# Patient Record
Sex: FEMALE | Race: WHITE | HISPANIC OR LATINO | URBAN - METROPOLITAN AREA
[De-identification: names, ages, dates, MRNs, and addresses within clinical notes are randomized per-mention and may not be internally consistent; named-entity substitution may affect disease eponyms.]

---

## 2019-07-03 PROBLEM — Z00.00 ENCOUNTER FOR PREVENTIVE HEALTH EXAMINATION: Status: ACTIVE | Noted: 2019-07-03

## 2019-07-20 ENCOUNTER — EMERGENCY (EMERGENCY)
Facility: HOSPITAL | Age: 29
LOS: 1 days | Discharge: HOME | End: 2019-07-20
Attending: STUDENT IN AN ORGANIZED HEALTH CARE EDUCATION/TRAINING PROGRAM
Payer: COMMERCIAL

## 2019-07-20 VITALS
WEIGHT: 169.98 LBS | HEIGHT: 62 IN | SYSTOLIC BLOOD PRESSURE: 136 MMHG | TEMPERATURE: 98 F | HEART RATE: 91 BPM | DIASTOLIC BLOOD PRESSURE: 86 MMHG | RESPIRATION RATE: 18 BRPM | OXYGEN SATURATION: 96 %

## 2019-07-20 VITALS
SYSTOLIC BLOOD PRESSURE: 125 MMHG | RESPIRATION RATE: 20 BRPM | HEART RATE: 85 BPM | OXYGEN SATURATION: 97 % | DIASTOLIC BLOOD PRESSURE: 71 MMHG

## 2019-07-20 LAB
ALBUMIN SERPL ELPH-MCNC: 3 G/DL — LOW (ref 3.5–5)
ALP SERPL-CCNC: 64 U/L — SIGNIFICANT CHANGE UP (ref 40–120)
ALT FLD-CCNC: 51 U/L DA — SIGNIFICANT CHANGE UP (ref 10–60)
ANION GAP SERPL CALC-SCNC: 5 MMOL/L — SIGNIFICANT CHANGE UP (ref 5–17)
APPEARANCE UR: CLEAR — SIGNIFICANT CHANGE UP
AST SERPL-CCNC: 26 U/L — SIGNIFICANT CHANGE UP (ref 10–40)
BACTERIA # UR AUTO: ABNORMAL /HPF
BILIRUB SERPL-MCNC: 0.6 MG/DL — SIGNIFICANT CHANGE UP (ref 0.2–1.2)
BILIRUB UR-MCNC: NEGATIVE — SIGNIFICANT CHANGE UP
BUN SERPL-MCNC: 11 MG/DL — SIGNIFICANT CHANGE UP (ref 7–18)
CALCIUM SERPL-MCNC: 8.7 MG/DL — SIGNIFICANT CHANGE UP (ref 8.4–10.5)
CHLORIDE SERPL-SCNC: 105 MMOL/L — SIGNIFICANT CHANGE UP (ref 96–108)
CO2 SERPL-SCNC: 26 MMOL/L — SIGNIFICANT CHANGE UP (ref 22–31)
COLOR SPEC: YELLOW — SIGNIFICANT CHANGE UP
CREAT SERPL-MCNC: 0.51 MG/DL — SIGNIFICANT CHANGE UP (ref 0.5–1.3)
DIFF PNL FLD: NEGATIVE — SIGNIFICANT CHANGE UP
EPI CELLS # UR: ABNORMAL /HPF
GLUCOSE SERPL-MCNC: 85 MG/DL — SIGNIFICANT CHANGE UP (ref 70–99)
GLUCOSE UR QL: NEGATIVE — SIGNIFICANT CHANGE UP
HCG UR QL: POSITIVE
HCT VFR BLD CALC: 37.8 % — SIGNIFICANT CHANGE UP (ref 34.5–45)
HGB BLD-MCNC: 12.8 G/DL — SIGNIFICANT CHANGE UP (ref 11.5–15.5)
KETONES UR-MCNC: ABNORMAL
LEUKOCYTE ESTERASE UR-ACNC: ABNORMAL
MCHC RBC-ENTMCNC: 29.6 PG — SIGNIFICANT CHANGE UP (ref 27–34)
MCHC RBC-ENTMCNC: 33.9 GM/DL — SIGNIFICANT CHANGE UP (ref 32–36)
MCV RBC AUTO: 87.5 FL — SIGNIFICANT CHANGE UP (ref 80–100)
NITRITE UR-MCNC: NEGATIVE — SIGNIFICANT CHANGE UP
NRBC # BLD: 0 /100 WBCS — SIGNIFICANT CHANGE UP (ref 0–0)
PH UR: 6 — SIGNIFICANT CHANGE UP (ref 5–8)
PLATELET # BLD AUTO: 214 K/UL — SIGNIFICANT CHANGE UP (ref 150–400)
POTASSIUM SERPL-MCNC: 3.9 MMOL/L — SIGNIFICANT CHANGE UP (ref 3.5–5.3)
POTASSIUM SERPL-SCNC: 3.9 MMOL/L — SIGNIFICANT CHANGE UP (ref 3.5–5.3)
PROT SERPL-MCNC: 7.3 G/DL — SIGNIFICANT CHANGE UP (ref 6–8.3)
PROT UR-MCNC: NEGATIVE — SIGNIFICANT CHANGE UP
RBC # BLD: 4.32 M/UL — SIGNIFICANT CHANGE UP (ref 3.8–5.2)
RBC # FLD: 12 % — SIGNIFICANT CHANGE UP (ref 10.3–14.5)
RBC CASTS # UR COMP ASSIST: SIGNIFICANT CHANGE UP /HPF (ref 0–2)
SODIUM SERPL-SCNC: 136 MMOL/L — SIGNIFICANT CHANGE UP (ref 135–145)
SP GR SPEC: 1.02 — SIGNIFICANT CHANGE UP (ref 1.01–1.02)
UROBILINOGEN FLD QL: NEGATIVE — SIGNIFICANT CHANGE UP
WBC # BLD: 6.7 K/UL — SIGNIFICANT CHANGE UP (ref 3.8–10.5)
WBC # FLD AUTO: 6.7 K/UL — SIGNIFICANT CHANGE UP (ref 3.8–10.5)
WBC UR QL: ABNORMAL /HPF (ref 0–5)

## 2019-07-20 PROCEDURE — 80053 COMPREHEN METABOLIC PANEL: CPT

## 2019-07-20 PROCEDURE — 85027 COMPLETE CBC AUTOMATED: CPT

## 2019-07-20 PROCEDURE — 81001 URINALYSIS AUTO W/SCOPE: CPT

## 2019-07-20 PROCEDURE — 36415 COLL VENOUS BLD VENIPUNCTURE: CPT

## 2019-07-20 PROCEDURE — 87086 URINE CULTURE/COLONY COUNT: CPT

## 2019-07-20 PROCEDURE — 99284 EMERGENCY DEPT VISIT MOD MDM: CPT

## 2019-07-20 PROCEDURE — 99283 EMERGENCY DEPT VISIT LOW MDM: CPT

## 2019-07-20 PROCEDURE — 81025 URINE PREGNANCY TEST: CPT

## 2019-07-20 RX ORDER — NITROFURANTOIN MACROCRYSTAL 50 MG
1 CAPSULE ORAL
Qty: 10 | Refills: 0
Start: 2019-07-20 | End: 2019-07-24

## 2019-07-20 RX ORDER — NITROFURANTOIN MACROCRYSTAL 50 MG
100 CAPSULE ORAL ONCE
Refills: 0 | Status: COMPLETED | OUTPATIENT
Start: 2019-07-20 | End: 2019-07-20

## 2019-07-20 RX ORDER — SODIUM CHLORIDE 9 MG/ML
1000 INJECTION, SOLUTION INTRAVENOUS ONCE
Refills: 0 | Status: COMPLETED | OUTPATIENT
Start: 2019-07-20 | End: 2019-07-20

## 2019-07-20 RX ADMIN — SODIUM CHLORIDE 1000 MILLILITER(S): 9 INJECTION, SOLUTION INTRAVENOUS at 21:10

## 2019-07-20 RX ADMIN — SODIUM CHLORIDE 1000 MILLILITER(S): 9 INJECTION, SOLUTION INTRAVENOUS at 21:11

## 2019-07-20 RX ADMIN — Medication 100 MILLIGRAM(S): at 21:10

## 2019-07-20 NOTE — ED PROVIDER NOTE - NSFOLLOWUPINSTRUCTIONS_ED_ALL_ED_FT
Extensive conversation regarding return precaution. Your evaluation today does not require any further acute medical interventions at this time. Please follow-up with your Primary Care doctor in 2 (two) days for continuity of care. Continue all home medications as prescribed by your doctor(s). Take all medications as instructed. Do not operate any heavy machinery when taking any pain relieving medications as it may cause drowsiness and impair cognitive functioning. If new symptoms arise or current symptoms worsen, return to the nearest Emergency Department immediately.    You may take Tylenol, 650mg by mouth every 4-6 as needed for pain or fever. Do not combine with any other acetaminophen medications. Do not take more than 4,000mg in a 24hour period unless you have liver or kidney diseases then the suggested maximum dose is 3,000mg in a 24hour period.

## 2019-07-20 NOTE — ED PROVIDER NOTE - NEUROLOGICAL, MLM
Alert and oriented, no focal deficits, no motor or sensory deficits. Alert and oriented, no focal deficits, no motor or sensory deficits, moving all extremities

## 2019-07-20 NOTE — ED PROVIDER NOTE - CONSTITUTIONAL, MLM
normal... Well appearing, well nourished, awake, alert, oriented to person, place, time/situation and in no apparent distress. Well appearing, well nourished, awake, alert, oriented to person, place, time/situation and in no apparent distress. speaking in full sentences.

## 2019-07-20 NOTE — ED ADULT NURSE NOTE - NSIMPLEMENTINTERV_GEN_ALL_ED
Implemented All Universal Safety Interventions:  [place] to call system. Call bell, personal items and telephone within reach. Instruct patient to call for assistance. Room bathroom lighting operational. Non-slip footwear when patient is off stretcher. Physically safe environment: no spills, clutter or unnecessary equipment. Stretcher in lowest position, wheels locked, appropriate side rails in place.

## 2019-07-20 NOTE — ED PROVIDER NOTE - PROGRESS NOTE DETAILS
Pt eating at bedside. No new complaints. Pending UA and UCx to be sent. Pt seen and re-evaluated at bedside.  Diagnostic tests reviewed and results discussed minus UA. In v/o improved symptoms will d/c home for OP f/u. Teach back successful, understands return precautions.

## 2019-07-20 NOTE — ED PROVIDER NOTE - CARDIAC, MLM
Normal rate, regular rhythm.  Heart sounds S1, S2.  No murmurs, rubs or gallops. Heart sounds S1, S2.  No murmurs, rubs or gallops.

## 2019-07-20 NOTE — ED PROVIDER NOTE - OBJECTIVE STATEMENT
28 y/o F patient  at 12 weeks gestation w/ PMHx of a recent UTI approximately x2 weeks ago presents to the ED c/o non-bloody diarrhea and pain with urination for the past few days. Patient reports she was eating cherry fruits at work when symptoms began. Patient states she has a UTI approximately x2 weeks ago and was provided Keflex which she completed. S/p completion of Keflex patient states she developed a yeast infection and was given suppositories which she states she did not complete. Patient notes she had a US at 9 weeks confirming she had a ITP. Patient states she has a follow-up appointment but does not recall the date.    Tolerating PO solids and liquids.  Patient denies vaginal bleeding, vaginal discharge, abd cramping, fever, or any other complaints. 30 y/o F patient , reports 12 weeks gestation, sent by private OB for c/o nonbloody diarrhea a/w pain with urination since yesterday. Reports recently dx UTI, completed Keflex 7 days approx 2 wks ago, followed by yeast infection, completed 4 days of suppositories.     Admits to eating cherry fruits at work followed by diarrhea. States private OB is aware, prescribing meds.  Reports had US at 9 weeks confirmed IUP.     Tolerating PO solids and liquids.  Patient denies vaginal bleeding/discharge, abd cramping, fever.    Pt denies falling, trauma, H/A, dizziness, LOC, changes in vision, CP, palpitations, SOB, cough, current abd pain, N/V, hematuria, incontinence, changes in bowel habit, weakness or numbness in the extremities, recent travel and known sick contacts.

## 2019-07-20 NOTE — ED ADULT TRIAGE NOTE - CHIEF COMPLAINT QUOTE
Mid upper abd pain, diarrhea, nausea x yesterday. Pt is 12 weeks pregnant. P/s eight episode of diarrhea today.

## 2019-07-22 ENCOUNTER — ASOB RESULT (OUTPATIENT)
Age: 29
End: 2019-07-22

## 2019-07-22 ENCOUNTER — APPOINTMENT (OUTPATIENT)
Dept: ANTEPARTUM | Facility: CLINIC | Age: 29
End: 2019-07-22
Payer: COMMERCIAL

## 2019-07-22 LAB
CULTURE RESULTS: SIGNIFICANT CHANGE UP
SPECIMEN SOURCE: SIGNIFICANT CHANGE UP

## 2019-07-22 PROCEDURE — 76813 OB US NUCHAL MEAS 1 GEST: CPT

## 2019-07-22 PROCEDURE — 76801 OB US < 14 WKS SINGLE FETUS: CPT

## 2019-07-22 PROCEDURE — 36416 COLLJ CAPILLARY BLOOD SPEC: CPT

## 2019-07-23 PROBLEM — N39.0 URINARY TRACT INFECTION, SITE NOT SPECIFIED: Chronic | Status: ACTIVE | Noted: 2019-07-20

## 2019-07-24 ENCOUNTER — APPOINTMENT (OUTPATIENT)
Dept: ANTEPARTUM | Facility: CLINIC | Age: 29
End: 2019-07-24
Payer: COMMERCIAL

## 2019-07-24 ENCOUNTER — ASOB RESULT (OUTPATIENT)
Age: 29
End: 2019-07-24

## 2019-07-24 PROCEDURE — 36415 COLL VENOUS BLD VENIPUNCTURE: CPT

## 2019-07-24 PROCEDURE — 99241 OFFICE CONSULTATION NEW/ESTAB PATIENT 15 MIN: CPT

## 2019-08-23 ENCOUNTER — ASOB RESULT (OUTPATIENT)
Age: 29
End: 2019-08-23

## 2019-08-23 ENCOUNTER — APPOINTMENT (OUTPATIENT)
Dept: ANTEPARTUM | Facility: CLINIC | Age: 29
End: 2019-08-23
Payer: COMMERCIAL

## 2019-08-23 PROCEDURE — 76805 OB US >/= 14 WKS SNGL FETUS: CPT

## 2019-09-18 ENCOUNTER — ASOB RESULT (OUTPATIENT)
Age: 29
End: 2019-09-18

## 2019-09-18 ENCOUNTER — APPOINTMENT (OUTPATIENT)
Dept: ANTEPARTUM | Facility: CLINIC | Age: 29
End: 2019-09-18
Payer: COMMERCIAL

## 2019-09-18 PROCEDURE — 76811 OB US DETAILED SNGL FETUS: CPT

## 2019-09-18 PROCEDURE — 99241 OFFICE CONSULTATION NEW/ESTAB PATIENT 15 MIN: CPT | Mod: 25

## 2019-10-02 ENCOUNTER — APPOINTMENT (OUTPATIENT)
Dept: ANTEPARTUM | Facility: CLINIC | Age: 29
End: 2019-10-02
Payer: COMMERCIAL

## 2019-10-02 ENCOUNTER — ASOB RESULT (OUTPATIENT)
Age: 29
End: 2019-10-02

## 2019-10-02 PROCEDURE — 76825 ECHO EXAM OF FETAL HEART: CPT

## 2019-10-02 PROCEDURE — 76827 ECHO EXAM OF FETAL HEART: CPT

## 2019-10-02 PROCEDURE — 93325 DOPPLER ECHO COLOR FLOW MAPG: CPT

## 2019-12-04 ENCOUNTER — ASOB RESULT (OUTPATIENT)
Age: 29
End: 2019-12-04

## 2019-12-04 ENCOUNTER — APPOINTMENT (OUTPATIENT)
Dept: ANTEPARTUM | Facility: CLINIC | Age: 29
End: 2019-12-04
Payer: COMMERCIAL

## 2019-12-04 PROCEDURE — 76816 OB US FOLLOW-UP PER FETUS: CPT

## 2019-12-04 PROCEDURE — 76819 FETAL BIOPHYS PROFIL W/O NST: CPT

## 2020-01-21 ENCOUNTER — OUTPATIENT (OUTPATIENT)
Dept: INPATIENT UNIT | Facility: HOSPITAL | Age: 30
LOS: 1 days | Discharge: HOME | End: 2020-01-21

## 2020-01-21 ENCOUNTER — TRANSCRIPTION ENCOUNTER (OUTPATIENT)
Age: 30
End: 2020-01-21

## 2020-01-21 ENCOUNTER — OUTPATIENT (OUTPATIENT)
Dept: INPATIENT UNIT | Facility: HOSPITAL | Age: 30
LOS: 1 days | Discharge: HOME | End: 2020-01-21
Payer: COMMERCIAL

## 2020-01-21 VITALS — SYSTOLIC BLOOD PRESSURE: 128 MMHG | HEART RATE: 71 BPM | DIASTOLIC BLOOD PRESSURE: 74 MMHG

## 2020-01-21 VITALS
SYSTOLIC BLOOD PRESSURE: 130 MMHG | HEART RATE: 75 BPM | TEMPERATURE: 99 F | DIASTOLIC BLOOD PRESSURE: 73 MMHG | RESPIRATION RATE: 16 BRPM

## 2020-01-21 VITALS
RESPIRATION RATE: 16 BRPM | SYSTOLIC BLOOD PRESSURE: 117 MMHG | TEMPERATURE: 98 F | HEART RATE: 83 BPM | DIASTOLIC BLOOD PRESSURE: 58 MMHG

## 2020-01-21 VITALS — TEMPERATURE: 99 F

## 2020-01-21 DIAGNOSIS — Z3A.00 WEEKS OF GESTATION OF PREGNANCY NOT SPECIFIED: ICD-10-CM

## 2020-01-21 DIAGNOSIS — Z98.890 OTHER SPECIFIED POSTPROCEDURAL STATES: Chronic | ICD-10-CM

## 2020-01-21 DIAGNOSIS — O26.899 OTHER SPECIFIED PREGNANCY RELATED CONDITIONS, UNSPECIFIED TRIMESTER: ICD-10-CM

## 2020-01-21 PROCEDURE — 99213 OFFICE O/P EST LOW 20 MIN: CPT | Mod: 25

## 2020-01-21 PROCEDURE — 76818 FETAL BIOPHYS PROFILE W/NST: CPT | Mod: 26

## 2020-01-21 PROCEDURE — 59025 FETAL NON-STRESS TEST: CPT | Mod: 26

## 2020-01-21 NOTE — OB PROVIDER TRIAGE NOTE - NSOBPROVIDERNOTE_OBGYN_ALL_OB_FT
Dr Sommer notified of above findings  Patient cleared to be discharge home on strict labor precautions as per Dr Sommer.

## 2020-01-21 NOTE — OB PROVIDER TRIAGE NOTE - NSOBPROVIDERNOTE_OBGYN_ALL_OB_FT
[facility] Provider - Dr Woodall    Patient is a   30y/o G  3 P 1011   @  38 4/7wks gestation who reports to triage with c/o  contractions q 10 to 15min and passing blood tinged mucus since 400.  Reports decreased fetal movements since last night.  Last good movement was felt     AP Course:  NT 95% - 3.5mm, increased risk for down syndrome                     -thickened nuchal fold on anatomy scan - fetal echo wnl  Meds -pnv  NKDA    PMH/PSH/GYN/SH - denies  OB  - - 2008 - 6lbs 9.5oz  -ETOP - 3yrs ago  GYN - HPV pap in this pregnancy - f/u post partum    Vital Signs Last 24 Hrs  T(C): 37 (2020 14:04), Max: 37 (2020 14:04)  T(F): 98.6 (2020 14:04), Max: 98.6 (2020 14:04)  HR: 75 (2020 14:56) (75 - 75)  BP: 119/77 (2020 14:56) (119/77 - 130/73)  RR: 16 (2020 14:04) (16 - 16)    Abdomen gravid, soft and nontender  Continue EFM    SSE - neg pooling/neg nitrazine  SVE - 3/60/-3  TAS - vtx/post plac/aafv  BPP -  [facility] Provider - Dr Woodall    Patient is a   30y/o G  3 P 1011   @  38 4/7wks gestation who reports to triage with c/o  contractions q 10 to 15min and passing blood tinged mucus since 400.  Reports decreased fetal movements since last night.  Last good movement was felt     AP Course:  NT 95% - 3.5mm, increased risk for down syndrome                     -thickened nuchal fold on anatomy scan - fetal echo wnl  Meds -pnv  NKDA    PMH/PSH/GYN/SH - denies  OB  - - 2008 - 6lbs 9.5oz  -ETOP - 3yrs ago  GYN - HPV pap in this pregnancy - f/u post partum    Vital Signs Last 24 Hrs  T(C): 37 (2020 14:04), Max: 37 (2020 14:04)  T(F): 98.6 (2020 14:04), Max: 98.6 (2020 14:04)  HR: 75 (2020 14:56) (75 - 75)  BP: 119/77 (2020 14:56) (119/77 - 130/73)  RR: 16 (2020 14:04) (16 - 16)    Abdomen gravid, soft and nontender  Continue EFM    SSE - neg pooling/neg nitrazine  SVE - 3/60/-3.  Patient was 2cm 3days ago  TAS - vtx/post plac/aafv  BPP -     Discussed patient with Dr. Sommer.  Plan:  patient is cleared for discharge.  Instructed to ambulate; return to triage if ROM or contractions become stronger and or more frequent.

## 2020-01-21 NOTE — OB RN TRIAGE NOTE - PMH
Abnormal cervical Papanicolaou smear, unspecified abnormal pap finding  right before preg.  Termination of pregnancy (fetus)    UTI (urinary tract infection)    Vaginal delivery   2008 6#9

## 2020-01-21 NOTE — OB PROVIDER TRIAGE NOTE - NSHPLABSRESULTS_GEN_ALL_CORE
Vital Signs Last 24 Hrs  T(C): 37.3 (21 Jan 2020 20:28), Max: 37.3 (21 Jan 2020 20:28)  T(F): 99.14 (21 Jan 2020 20:28), Max: 99.14 (21 Jan 2020 20:28)  HR: 82 (21 Jan 2020 20:23) (71 - 83)  BP: 122/73 (21 Jan 2020 20:23) (117/58 - 130/73)  BP(mean): --  RR: 16 (21 Jan 2020 19:54) (16 - 16)  SpO2: --

## 2020-01-21 NOTE — OB PROVIDER TRIAGE NOTE - ADDITIONAL INSTRUCTIONS
Follow-up in the office for routine OB care if undelivered  Continue fetal kick counts  Return to triage for: decreased fetal movement, leakage of fluid, vaginal bleeding, increase in contraction frequency and intensity, or for any other concerns.

## 2020-01-21 NOTE — OB PROVIDER TRIAGE NOTE - NS_OBGYNHISTORY_OBGYN_ALL_OB_FT
2008-Uncomplicated  @ 38+ wks. Female, 6.9#  2014-Medically induced ETOP  2019-Abn. PAP/LSIL, s/p colposcopy, will f/u PP.  Pt denies any h/o: ovarian cysts, fibroids, breast problems, STDs/STIs

## 2020-01-21 NOTE — OB PROVIDER TRIAGE NOTE - NSHPPHYSICALEXAM_GEN_ALL_CORE
Vital Signs Last 24 Hrs  T(C): 37 (21 Jan 2020 14:04), Max: 37 (21 Jan 2020 14:04)  HR: 71 (21 Jan 2020 15:19) (71 - 75)  BP: 128/74 (21 Jan 2020 15:19) (119/77 - 130/73)  RR: 16 (21 Jan 2020 14:04) (16 - 16)    Abdomen gravid, soft and nontender  Continue EFM    SSE - neg pooling/neg nitrazine  SVE - 3/60/-3  TAS - vtx/post plac/aafv  BPP - 8/8

## 2020-01-21 NOTE — OB PROVIDER TRIAGE NOTE - NSHPPHYSICALEXAM_GEN_ALL_CORE
Patient is a  28y/o  female, para 1011  @  38 4/7wks gestation, SIUP.   Early Labor  Gen: NAD, A+O x3  Cardiac: R/R/R  Pulm: CTAB  Abd: gravid, soft, non-tender  VS--BP: 122/73, P 82, RR 18, T 37.3, Pain 8/10-Pt declined any pain meds at this time.   Cat 1 tracin bpm, moderate variability, +accels, no decels  [place]: Irregular, q 7 min.  SVE - 3.5/70/-3  GBS Negative  Clinical EFW 3400g

## 2020-01-21 NOTE — OB PROVIDER TRIAGE NOTE - HISTORY OF PRESENT ILLNESS
[facility] Provider - Dr Woodall    Patient is a   30y/o G  3 P 1011   @  38 4/7wks gestation who reports to triage with c/o  contractions q 10 to 15min and passing blood tinged mucus since 0400.  Reports decreased fetal movements since last night.  Last good movement was felt     AP Course:  NT 95% - 3.5mm, increased risk for down syndrome                     -thickened nuchal fold on anatomy scan - fetal echo wnl  Meds -pnv  NKDA

## 2020-01-21 NOTE — OB PROVIDER TRIAGE NOTE - PLAN OF CARE
Patient cleared to be discharge home as per Dr Sommer  Continue fetal kick counts  Return to triage for: decreased fetal movement, leakage of fluid, vaginal bleeding, increase in contraction frequency and intensity, or for any other concerns.  Strict labor precautions reviewed with patient. Patient verbalized understanding

## 2020-01-21 NOTE — OB PROVIDER TRIAGE NOTE - PMH
Abnormal cervical Papanicolaou smear, unspecified abnormal pap finding  right before preg.  UTI (urinary tract infection)

## 2020-01-21 NOTE — OB PROVIDER TRIAGE NOTE - ADDITIONAL INSTRUCTIONS
30-Jun-2018
Discussed patient with Dr. Sommer.  Plan:  patient is cleared for discharge.  Instructed to ambulate; return to triage if ROM or contractions become stronger and or more frequent.

## 2020-01-21 NOTE — OB PROVIDER TRIAGE NOTE - HISTORY OF PRESENT ILLNESS
[facility] Provider - Dr Woodall  Patient is a  30y/o  female, para 1011  @  38 4/7wks gestation, SIUP.   Returns to triage post early labor evaluation 5 hrs ago with VE: 3/60/-3, with c/o irregular ctx q 7min.  Pt endorses +FM, denies any leakage of fluid, vaginal bleeding. GBS negative.     AP Course:  NT 95% - 3.5mm, increased risk for down syndrome                     -thickened nuchal fold on anatomy scan - fetal echo wnl  Allergies: NKDA  Meds -PNV  PMH: Denies  PSH: Colposcopy with cervical Bx (')  OBgynHx    2008-Uncomplicated  @ 38+ wks. Female, 6.9#  2014-Medically induced ETOP  2019-Abn. PAP/LSIL, s/p colposcopy, will f/u PP.  Pt denies any h/o: ovarian cysts, fibroids, breast problems, STDs/STIs  PSY: Denies  EtOH/smoke/recreational substance use: denies  FH: MI (Father)

## 2020-01-22 ENCOUNTER — INPATIENT (INPATIENT)
Facility: HOSPITAL | Age: 30
LOS: 1 days | Discharge: HOME | End: 2020-01-24
Attending: OBSTETRICS & GYNECOLOGY | Admitting: OBSTETRICS & GYNECOLOGY

## 2020-01-22 ENCOUNTER — TRANSCRIPTION ENCOUNTER (OUTPATIENT)
Age: 30
End: 2020-01-22

## 2020-01-22 VITALS
HEART RATE: 75 BPM | RESPIRATION RATE: 20 BRPM | SYSTOLIC BLOOD PRESSURE: 119 MMHG | TEMPERATURE: 98 F | DIASTOLIC BLOOD PRESSURE: 62 MMHG

## 2020-01-22 DIAGNOSIS — Z98.890 OTHER SPECIFIED POSTPROCEDURAL STATES: Chronic | ICD-10-CM

## 2020-01-22 DIAGNOSIS — Z3A.00 WEEKS OF GESTATION OF PREGNANCY NOT SPECIFIED: ICD-10-CM

## 2020-01-22 DIAGNOSIS — O26.899 OTHER SPECIFIED PREGNANCY RELATED CONDITIONS, UNSPECIFIED TRIMESTER: ICD-10-CM

## 2020-01-22 PROBLEM — Z33.2 ENCOUNTER FOR ELECTIVE TERMINATION OF PREGNANCY: Chronic | Status: ACTIVE | Noted: 2020-01-21

## 2020-01-22 PROBLEM — R87.619 UNSPECIFIED ABNORMAL CYTOLOGICAL FINDINGS IN SPECIMENS FROM CERVIX UTERI: Chronic | Status: ACTIVE | Noted: 2020-01-21

## 2020-01-22 LAB
BASOPHILS # BLD AUTO: 0.02 K/UL — SIGNIFICANT CHANGE UP (ref 0–0.2)
BASOPHILS NFR BLD AUTO: 0.2 % — SIGNIFICANT CHANGE UP (ref 0–2)
BLD GP AB SCN SERPL QL: NEGATIVE — SIGNIFICANT CHANGE UP
EOSINOPHIL # BLD AUTO: 0.01 K/UL — SIGNIFICANT CHANGE UP (ref 0–0.5)
EOSINOPHIL NFR BLD AUTO: 0.1 % — SIGNIFICANT CHANGE UP (ref 0–6)
HCT VFR BLD CALC: 37.3 % — SIGNIFICANT CHANGE UP (ref 34.5–45)
HGB BLD-MCNC: 12.5 G/DL — SIGNIFICANT CHANGE UP (ref 11.5–15.5)
IMM GRANULOCYTES NFR BLD AUTO: 0.3 % — SIGNIFICANT CHANGE UP (ref 0–1.5)
LYMPHOCYTES # BLD AUTO: 1.02 K/UL — SIGNIFICANT CHANGE UP (ref 1–3.3)
LYMPHOCYTES # BLD AUTO: 10.3 % — LOW (ref 13–44)
MCHC RBC-ENTMCNC: 28.8 PG — SIGNIFICANT CHANGE UP (ref 27–34)
MCHC RBC-ENTMCNC: 33.5 % — SIGNIFICANT CHANGE UP (ref 32–36)
MCV RBC AUTO: 85.9 FL — SIGNIFICANT CHANGE UP (ref 80–100)
MONOCYTES # BLD AUTO: 0.55 K/UL — SIGNIFICANT CHANGE UP (ref 0–0.9)
MONOCYTES NFR BLD AUTO: 5.6 % — SIGNIFICANT CHANGE UP (ref 2–14)
NEUTROPHILS # BLD AUTO: 8.23 K/UL — HIGH (ref 1.8–7.4)
NEUTROPHILS NFR BLD AUTO: 83.5 % — HIGH (ref 43–77)
NRBC # FLD: 0 K/UL — SIGNIFICANT CHANGE UP (ref 0–0)
PLATELET # BLD AUTO: 234 K/UL — SIGNIFICANT CHANGE UP (ref 150–400)
PMV BLD: 9.9 FL — SIGNIFICANT CHANGE UP (ref 7–13)
RBC # BLD: 4.34 M/UL — SIGNIFICANT CHANGE UP (ref 3.8–5.2)
RBC # FLD: 14 % — SIGNIFICANT CHANGE UP (ref 10.3–14.5)
RH IG SCN BLD-IMP: POSITIVE — SIGNIFICANT CHANGE UP
RH IG SCN BLD-IMP: POSITIVE — SIGNIFICANT CHANGE UP
WBC # BLD: 9.86 K/UL — SIGNIFICANT CHANGE UP (ref 3.8–10.5)
WBC # FLD AUTO: 9.86 K/UL — SIGNIFICANT CHANGE UP (ref 3.8–10.5)

## 2020-01-22 RX ORDER — DIPHENHYDRAMINE HCL 50 MG
25 CAPSULE ORAL EVERY 6 HOURS
Refills: 0 | Status: DISCONTINUED | OUTPATIENT
Start: 2020-01-22 | End: 2020-01-24

## 2020-01-22 RX ORDER — ONDANSETRON 8 MG/1
4 TABLET, FILM COATED ORAL ONCE
Refills: 0 | Status: COMPLETED | OUTPATIENT
Start: 2020-01-22 | End: 2020-01-22

## 2020-01-22 RX ORDER — IBUPROFEN 200 MG
600 TABLET ORAL EVERY 6 HOURS
Refills: 0 | Status: DISCONTINUED | OUTPATIENT
Start: 2020-01-22 | End: 2020-01-24

## 2020-01-22 RX ORDER — HYDROCORTISONE 1 %
1 OINTMENT (GRAM) TOPICAL EVERY 6 HOURS
Refills: 0 | Status: DISCONTINUED | OUTPATIENT
Start: 2020-01-22 | End: 2020-01-24

## 2020-01-22 RX ORDER — SODIUM CHLORIDE 9 MG/ML
1000 INJECTION, SOLUTION INTRAVENOUS
Refills: 0 | Status: DISCONTINUED | OUTPATIENT
Start: 2020-01-22 | End: 2020-01-22

## 2020-01-22 RX ORDER — OXYCODONE HYDROCHLORIDE 5 MG/1
5 TABLET ORAL ONCE
Refills: 0 | Status: DISCONTINUED | OUTPATIENT
Start: 2020-01-22 | End: 2020-01-24

## 2020-01-22 RX ORDER — KETOROLAC TROMETHAMINE 30 MG/ML
30 SYRINGE (ML) INJECTION ONCE
Refills: 0 | Status: DISCONTINUED | OUTPATIENT
Start: 2020-01-22 | End: 2020-01-22

## 2020-01-22 RX ORDER — TETANUS TOXOID, REDUCED DIPHTHERIA TOXOID AND ACELLULAR PERTUSSIS VACCINE, ADSORBED 5; 2.5; 8; 8; 2.5 [IU]/.5ML; [IU]/.5ML; UG/.5ML; UG/.5ML; UG/.5ML
0.5 SUSPENSION INTRAMUSCULAR ONCE
Refills: 0 | Status: DISCONTINUED | OUTPATIENT
Start: 2020-01-22 | End: 2020-01-24

## 2020-01-22 RX ORDER — SIMETHICONE 80 MG/1
80 TABLET, CHEWABLE ORAL EVERY 4 HOURS
Refills: 0 | Status: DISCONTINUED | OUTPATIENT
Start: 2020-01-22 | End: 2020-01-24

## 2020-01-22 RX ORDER — LANOLIN
1 OINTMENT (GRAM) TOPICAL EVERY 6 HOURS
Refills: 0 | Status: DISCONTINUED | OUTPATIENT
Start: 2020-01-22 | End: 2020-01-24

## 2020-01-22 RX ORDER — ACETAMINOPHEN 500 MG
975 TABLET ORAL
Refills: 0 | Status: DISCONTINUED | OUTPATIENT
Start: 2020-01-22 | End: 2020-01-24

## 2020-01-22 RX ORDER — IBUPROFEN 200 MG
600 TABLET ORAL EVERY 6 HOURS
Refills: 0 | Status: COMPLETED | OUTPATIENT
Start: 2020-01-22 | End: 2020-12-20

## 2020-01-22 RX ORDER — GLYCERIN ADULT
1 SUPPOSITORY, RECTAL RECTAL AT BEDTIME
Refills: 0 | Status: DISCONTINUED | OUTPATIENT
Start: 2020-01-22 | End: 2020-01-24

## 2020-01-22 RX ORDER — DIBUCAINE 1 %
1 OINTMENT (GRAM) RECTAL EVERY 6 HOURS
Refills: 0 | Status: DISCONTINUED | OUTPATIENT
Start: 2020-01-22 | End: 2020-01-24

## 2020-01-22 RX ORDER — OXYCODONE HYDROCHLORIDE 5 MG/1
5 TABLET ORAL
Refills: 0 | Status: DISCONTINUED | OUTPATIENT
Start: 2020-01-22 | End: 2020-01-24

## 2020-01-22 RX ORDER — OXYTOCIN 10 UNIT/ML
333.33 VIAL (ML) INJECTION
Qty: 20 | Refills: 0 | Status: DISCONTINUED | OUTPATIENT
Start: 2020-01-22 | End: 2020-01-23

## 2020-01-22 RX ORDER — MAGNESIUM HYDROXIDE 400 MG/1
30 TABLET, CHEWABLE ORAL
Refills: 0 | Status: DISCONTINUED | OUTPATIENT
Start: 2020-01-22 | End: 2020-01-24

## 2020-01-22 RX ORDER — OXYTOCIN 10 UNIT/ML
2 VIAL (ML) INJECTION
Qty: 30 | Refills: 0 | Status: DISCONTINUED | OUTPATIENT
Start: 2020-01-22 | End: 2020-01-23

## 2020-01-22 RX ORDER — SODIUM CHLORIDE 9 MG/ML
3 INJECTION INTRAMUSCULAR; INTRAVENOUS; SUBCUTANEOUS EVERY 8 HOURS
Refills: 0 | Status: DISCONTINUED | OUTPATIENT
Start: 2020-01-22 | End: 2020-01-24

## 2020-01-22 RX ORDER — OXYTOCIN 10 UNIT/ML
333.33 VIAL (ML) INJECTION
Qty: 20 | Refills: 0 | Status: DISCONTINUED | OUTPATIENT
Start: 2020-01-22 | End: 2020-01-22

## 2020-01-22 RX ORDER — PRAMOXINE HYDROCHLORIDE 150 MG/15G
1 AEROSOL, FOAM RECTAL EVERY 4 HOURS
Refills: 0 | Status: DISCONTINUED | OUTPATIENT
Start: 2020-01-22 | End: 2020-01-24

## 2020-01-22 RX ORDER — AER TRAVELER 0.5 G/1
1 SOLUTION RECTAL; TOPICAL EVERY 4 HOURS
Refills: 0 | Status: DISCONTINUED | OUTPATIENT
Start: 2020-01-22 | End: 2020-01-24

## 2020-01-22 RX ORDER — BENZOCAINE 10 %
1 GEL (GRAM) MUCOUS MEMBRANE EVERY 6 HOURS
Refills: 0 | Status: DISCONTINUED | OUTPATIENT
Start: 2020-01-22 | End: 2020-01-24

## 2020-01-22 RX ADMIN — Medication 1000 MILLIUNIT(S)/MIN: at 19:05

## 2020-01-22 RX ADMIN — SODIUM CHLORIDE 125 MILLILITER(S): 9 INJECTION, SOLUTION INTRAVENOUS at 10:45

## 2020-01-22 RX ADMIN — Medication 30 MILLIGRAM(S): at 20:29

## 2020-01-22 RX ADMIN — SODIUM CHLORIDE 3 MILLILITER(S): 9 INJECTION INTRAMUSCULAR; INTRAVENOUS; SUBCUTANEOUS at 23:07

## 2020-01-22 RX ADMIN — Medication 30 MILLIGRAM(S): at 20:17

## 2020-01-22 RX ADMIN — ONDANSETRON 4 MILLIGRAM(S): 8 TABLET, FILM COATED ORAL at 19:38

## 2020-01-22 RX ADMIN — Medication 2 MILLIUNIT(S)/MIN: at 16:15

## 2020-01-22 NOTE — OB PROVIDER IHI INDUCTION/AUGMENTATION NOTE - NS_CHECKALL_OBGYN_ALL_OB
Induction / Augmentation was discussed/H&P was completed/Contractions pattern was reviewed/FHR was reviewed/Order was written

## 2020-01-22 NOTE — OB PROVIDER TRIAGE NOTE - NSHPPHYSICALEXAM_GEN_ALL_CORE
Vital Signs Last 24 Hrs  T(C): 36.9 (22 Jan 2020 10:18), Max: 37.3 (21 Jan 2020 20:28)  T(F): 98.42 (22 Jan 2020 10:18), Max: 99.14 (21 Jan 2020 20:28)  HR: 85 (22 Jan 2020 10:48) (71 - 85)  BP: 135/70 (22 Jan 2020 10:45) (117/58 - 135/70)  BP(mean): --  RR: 20 (22 Jan 2020 10:15) (16 - 20)  SpO2: --    Abdomen soft, non tender  Gen: A&O x3  Cardiac: R/R/R  Pulmonary: regular respirations, lungs clear anteriorly & posteriorly  SVE: 6.5/70/-3  TAS: vertex presentation  EFW 3260 gm by Leopold's    NST in progress

## 2020-01-22 NOTE — OB PROVIDER H&P - ASSESSMENT
30y/o  pt 38.4 weeks  with c/o of painful, regular contractions q5 minutes. Returns to triage post early labor evaluation @ 2100 with VE: 3.5/70/-3, with c/o irregular ctx q 7min. pt denies bleeding & LOF. pt denies n/v/d, fever or chills. pt endorses +fetal movement   AP Course:  NT 95% - 3.5mm, increased risk for down syndrome                     -thickened nuchal fold on anatomy scan - fetal echo wnl    Allergies: NKDA  Meds: PNV  PMH: Denies  PSH: denies  OB/GYN:  Colposcopy with cervical Bx (')   08 FT 6#9  -Medically induced ETOP  social hx: denies    Abdomen soft, non tender  Gen: A&O x3  Cardiac: R/R/R  Pulmonary: regular respirations, lungs clear anteriorly & posteriorly  SVE: 6.5/70/-3  TAS: vertex presentation  EFW 3260 gm by Leopold's    NST in progress     evidence of active labor  d/w with Dr Thorne & Jerilyn Garzon NP   admit l&d  Labor @ 38.4 weeks for pain management   GBS negative  clear fluids

## 2020-01-22 NOTE — CHART NOTE - NSCHARTNOTEFT_GEN_A_CORE
pt seen and examined for rectal pressure  VE: ant lip/100/-1   ctx q 2-3 min on 4 mu pitocin   plan to labor down with peanut ball   resuscitative measures for cat 2 fht

## 2020-01-22 NOTE — OB PROVIDER TRIAGE NOTE - NSOBPROVIDERNOTE_OBGYN_ALL_OB_FT
30y/o  pt 38.4 weeks  with c/o of painful, regular contractions q5 minutes. Returns to triage post early labor evaluation @ 2100 with VE: 3.5/70/-3, with c/o irregular ctx q 7min. pt denies bleeding & LOF. pt denies n/v/d, fever or chills. pt endorses +fetal movement   AP Course:  NT 95% - 3.5mm, increased risk for down syndrome                     -thickened nuchal fold on anatomy scan - fetal echo wnl    Allergies: NKDA  Meds: PNV  PMH: Denies  PSH: denies  OB/GYN:  Colposcopy with cervical Bx (')   08 FT 6#9  -Medically induced ETOP  social hx: denies    Abdomen soft, non tender  Gen: A&O x3  Cardiac: R/R/R  Pulmonary: regular respirations, lungs clear anteriorly & posteriorly  SVE: 6.5/70/-3  TAS: vertex presentation  EFW 3260 gm by Leopold's    NST in progress     evidence of active labor  d/w with Dr Thorne   admit l&d  Labor @ 38.4 weeks for pain management   GBS negative  clear fluids

## 2020-01-22 NOTE — CHART NOTE - NSCHARTNOTEFT_GEN_A_CORE
pt seen and examined   VE: 6.5/90/-3   AROM performed, fluid clear   IUPC placed   fht cat 1   ctx irregular   Plan for pitocin augmentation now   epidural in place for pain control

## 2020-01-22 NOTE — OB PROVIDER DELIVERY SUMMARY - NS_ADMITLABOR_OBGYN_ALL_OB
[address] [facility] 
[phone] Patient: Silas Noland MRN: [id number] KMW:[birth year] You are allergic to the following No active allergies Recent Documentation Height Weight BMI Smoking Status 1.778 m 66.6 kg 21.07 kg/m2 Never Smoker Emergency Contacts Name Discharge Info Relation Home Work Mobile Juan Carlos Ratliff CAREGIVER [3] Girlfriend [18]   [phone] Trell Guzman DISCHARGE CAREGIVER [3] Brother [24]   [phone] About your hospitalization You were admitted on:  March 8, 2017 You last received care in the:  [facility] ORTHOPEDICS You were discharged on:  March 17, 2017 Why you were hospitalized Your primary diagnosis was:  Not on File Your diagnoses also included:  Trimalleolar Fracture Of Ankle, Closed Providers Seen During Your Hospitalizations Provider Role Specialty Primary office phone Sunny Frazier MD Attending Provider Orthopedic Surgery [phone] Your Primary Care Physician (PCP) Primary Care Physician Office Phone Office Fax OTHER, PHYS ** None ** ** None ** Follow-up Information Follow up With Details Comments Contact Info Colette Martinez Sa, MD On 3/20/2017  [address] [id number] 
[phone] Sunny Frazier MD In 1 week  [address] [facility] 
[phone] Current Discharge Medication List  
  
START taking these medications Dose & Instructions Dispensing Information Comments Morning Noon Evening Bedtime  
 acetaminophen 325 mg tablet Commonly known as:  TYLENOL Your last dose was: Your next dose is:    
   
   
 Dose:  650 mg Take 2 Tabs by mouth every six (6) hours for 14 days. Quantity:  112 Tab Refills:  0 oxyCODONE IR 5 mg immediate release tablet Commonly known as:  Ai Narayanan Your last dose was: Your next dose is:    
   
   
 Dose:  5-10 mg Take 1-2 Tabs by mouth every four (4) hours as needed. Max Daily Amount: 60 mg.  
 Quantity:  30 Tab Refills:  0  
     
   
   
   
  
 polyethylene glycol 17 gram/dose powder Commonly known as:  Karoline Garcia Your last dose was: Your next dose is:    
   
   
 Dose:  17 g Take 17 g by mouth daily for 15 days. Quantity:  255 g Refills:  0  
     
   
   
   
  
 senna-docusate 8.6-50 mg per tablet Commonly known as:  SENNA PLUS Your last dose was: Your next dose is:    
   
   
 Dose:  1 Tab Take 1 Tab by mouth two (2) times a day. Take until having regular bowel movements. Quantity:  60 Tab Refills:  0 Where to Get Your Medications Information on where to get these meds will be given to you by the nurse or doctor. ! Ask your nurse or doctor about these medications  
  acetaminophen 325 mg tablet  
 oxyCODONE IR 5 mg immediate release tablet  
 polyethylene glycol 17 gram/dose powder  
 senna-docusate 8.6-50 mg per tablet Discharge Instructions [address] [facility] Juan Traylor MD 
Postoperative Instructions Right/Left Lower Extremity: 
 _X__Non Weight Bearing    ___Postop Shoe 
 ___Heel touch weightbearing               ___CAM Boot 
 ___Weightbearing as tolerated   _X__Splint/Cast 
 
Dressing: 
 __X_Keep Dressing or Splint clean & dry 
 _X__Do Not remove dressing; Dressing will be changed at first postoperative visit. Showering: ? You may shower 48 hours after your surgery. Do Not allow dressing or splint to get wet! Diet:  
? Advance diet as tolerated. You may experience nausea due to anesthesia, pain or pain medications. You should avoid spicy or heavy meals initially. Pain Management: ? If you have received a block, the pain relief can last from 4-24 hours. This also means you may not have sensation or movement in your foot for the same amount of time. ? You will have pain after the block wears off. Anticipate this and start pain medications prior to the block wearing off. 
? Do Not drive while taking narcotic pain medications. Elevation: 
? Strict elevation at or just above the level of your heart for the first 14 days after surgery. Limit time that foot is in dependent position for trips to bathroom and kitchen as much as possible. Early control of swelling will improve future swelling. Ice:  
? __X___ Loraine Conterras may ice your surgical extremity for no longer than 20 minutes at a time, 3-4 times per day. Do Not apply ice directly to the skin. ? _____ Do Not apply ice to surgical extremity. Aspirin therapy:  
? Please DO NOT take any NSAIDs (Ibuprofen, Advil, Motrin, or Aleve) Call our office at [phone] if the following occur: ? Severe swelling, profuse bleeding or severe pain which does not improve with pain medication. ? Fever greater than 101.0; fevers less than this are very common in the first few days after surgery and are unlikely to indicate infection. Follow up: Next week- call the office to find out your appointment time. Discharge Orders None General Information Please provide this summary of care documentation to your next provider. Introducing [facility]! Romayne Duster introduces [employer] patient portal. Now you can access parts of your medical record, email your doctor's office, and request medication refills online. 1. In your internet browser, go to [email or web address] 2. Click on the First Time User? Click Here link in the Sign In box. You will see the New Member Sign Up page. 3. Enter your [employer] Access Code exactly as it appears below.  You will not need to use this code after youve completed the sign-up process. If you do not sign up before the expiration date, you must request a new code. · [employer] Access Code: [email or web address] Expires: 5/25/2017  1:38 PM 
 
4. Enter the last four digits of your Social Security Number (xxxx) and Date of Birth (mm/dd/yyyy) as indicated and click Submit. You will be taken to the next sign-up page. 5. Create a [employer] ID. This will be your [employer] login ID and cannot be changed, so think of one that is secure and easy to remember. 6. Create a [employer] password. You can change your password at any time. 7. Enter your Password Reset Question and Answer. This can be used at a later time if you forget your password. 8. Enter your e-mail address. You will receive e-mail notification when new information is available in [address] 9. Click Sign Up. You can now view and download portions of your medical record. 10. Click the Download Summary menu link to download a portable copy of your medical information. If you have questions, please visit the Frequently Asked Questions section of the [employer] website. Remember, [employer] is NOT to be used for urgent needs. For medical emergencies, dial 911. Now available from your iPhone and Android! Patient Signature:  ____________________________________________________________ Date:  ____________________________________________________________  
  
Rajendra Reyes Provider Signature:  ____________________________________________________________ Date:  ____________________________________________________________ Yes

## 2020-01-22 NOTE — OB PROVIDER TRIAGE NOTE - HISTORY OF PRESENT ILLNESS
28y/o  pt 38.4 weeks  with c/o of painful, regular contractions q5 minutes. Returns to triage post early labor evaluation @ 2100 with VE: 3.5/70/-3, with c/o irregular ctx q 7min. pt denies bleeding & LOF. pt denies n/v/d, fever or chills. pt endorses +fetal movement   AP Course:  NT 95% - 3.5mm, increased risk for down syndrome                     -thickened nuchal fold on anatomy scan - fetal echo wnl    Allergies: NKDA  Meds: PNV  PMH: Denies  PSH: denies  OB/GYN:  Colposcopy with cervical Bx (')   08 FT 6#9  -Medically induced ETOP  social hx: denies

## 2020-01-22 NOTE — DISCHARGE NOTE OB - MATERIALS PROVIDED
Postpartum education materials, car seat safety Tdap Vaccination (VIS Pub Date: 2012)/Breastfeeding Mother’s Support Group Information/Guide to Postpartum Care/Postpartum education materials, car seat safety/Birth Certificate Instructions/  Immunization Record/Breastfeeding Log/Shaken Baby Prevention Handout/[facility] Screening Program/Discharge Medication Information for Patients and Families Pocket Guide

## 2020-01-22 NOTE — OB PROVIDER H&P - HISTORY OF PRESENT ILLNESS
30y/o  pt 38.4 weeks  with c/o of painful, regular contractions q5 minutes. Returns to triage post early labor evaluation @ 2100 with VE: 3.5/70/-3, with c/o irregular ctx q 7min. pt denies bleeding & LOF. pt denies n/v/d, fever or chills. pt endorses +fetal movement   AP Course:  NT 95% - 3.5mm, increased risk for down syndrome                     -thickened nuchal fold on anatomy scan - fetal echo wnl    Allergies: NKDA  Meds: PNV  PMH: Denies  PSH: denies  OB/GYN:  Colposcopy with cervical Bx (')   08 FT 6#9  -Medically induced ETOP  social hx: denies

## 2020-01-22 NOTE — OB RN DELIVERY SUMMARY - BABY A: APGAR 1 MIN RESP RATE, DELIVERY
[facility]    General Surgery  Daily Post-Op Note       Assessment and Plan:   Ivan Gomez is a 89 year old male S/P Procedure(s):  CHOLECYSTECTOMY, LAPAROSCOPIC, WITH CHOLANGIOGRAM, 2 Days Post-Op    Pain: mainly in RUQ, controlled with Oxycodone, has IVP dilaudid available if unable to take po  Bowel: pt reports no flatus, Nursing notes reports passing flatus  Diet: NPO for EGD later today  IVFs: D5 @ 100 mL/hr  Activity: encourage up to chair, ambulation, PT/OT following - pt refused therapies yesterday  DVT prophylaxis: ambulation and PCd   Dispo: several days depending on clinical course        Interval History:   Pt sitting up in chair.  Having nausea while taking pills and feels like pills are getting stuck.  Doesn't want to eat due to nausea and fear of diarrhea. GI will do EGD today.           Physical Exam:   Temp: 97.4  F (36.3  C) Temp src: Oral BP: 102/65 Pulse: 60 Heart Rate: 59 Resp: 16 SpO2: 93 % O2 Device: None (Room air) Oxygen Delivery: 1 LPM    I/O last 3 completed shifts:  In: 3932.67 [P.O.:540; I.V.:3392.67]  Out: 500 [Urine:500]      Constitutional: alert and no distress   Abdomen: Abdomen soft, non-tender. distention is improved, steri strips in place - clean/dry/intact       Data   Recent Labs   Lab 05/24/19  0729 05/23/19  0656 05/22/19  0935   WBC 5.3 6.6 3.1*   HGB 10.6* 11.3* 10.9*   MCV 93 95 95   PLT 62* 77* 75*    146* 147*   POTASSIUM 4.5 4.8 4.5   CHLORIDE 111* 116* 117*   CO2 20 22 22   BUN 55* 54* 49*   CR 1.59* 1.75* 1.53*   ANIONGAP 8 8 8   GIUSEPPE 8.0* 8.5 8.4*   * 105* 83   ALBUMIN  --  2.5* 2.6*   PROTTOTAL  --  5.8* 6.0*   BILITOTAL  --  1.7* 2.3*   ALKPHOS  --  259* 298*   ALT  --  58 58   AST  --  39 23       Milagro Craig PA-C   (1) weak, irregular

## 2020-01-22 NOTE — DISCHARGE NOTE OB - PATIENT PORTAL LINK FT
You can access the FollowMyHealth Patient Portal offered by [facility] by registering at the following website: [email or web address]. By joining [employer]’s FollowMyHealth portal, you will also be able to view your health information using other applications (apps) compatible with our system.

## 2020-01-22 NOTE — DISCHARGE NOTE OB - CARE PROVIDER_API CALL
Angie Thorne)  Obstetrics and Gynecology Obstetrics and Gynocology  [address] [place] [address]  Phone: [phone]  Fax: [phone]  Follow Up Time:

## 2020-01-23 LAB — T PALLIDUM AB TITR SER: NEGATIVE — SIGNIFICANT CHANGE UP

## 2020-01-23 RX ORDER — ACETAMINOPHEN 500 MG
3 TABLET ORAL
Qty: 0 | Refills: 0 | DISCHARGE
Start: 2020-01-23

## 2020-01-23 RX ORDER — IBUPROFEN 200 MG
1 TABLET ORAL
Qty: 0 | Refills: 0 | DISCHARGE
Start: 2020-01-23

## 2020-01-23 RX ADMIN — Medication 975 MILLIGRAM(S): at 14:50

## 2020-01-23 RX ADMIN — Medication 600 MILLIGRAM(S): at 18:40

## 2020-01-23 RX ADMIN — Medication 975 MILLIGRAM(S): at 15:50

## 2020-01-23 RX ADMIN — Medication 1 TABLET(S): at 11:52

## 2020-01-23 RX ADMIN — Medication 600 MILLIGRAM(S): at 06:21

## 2020-01-23 RX ADMIN — Medication 600 MILLIGRAM(S): at 11:52

## 2020-01-23 RX ADMIN — Medication 600 MILLIGRAM(S): at 06:20

## 2020-01-23 RX ADMIN — Medication 600 MILLIGRAM(S): at 17:42

## 2020-01-23 RX ADMIN — SODIUM CHLORIDE 3 MILLILITER(S): 9 INJECTION INTRAMUSCULAR; INTRAVENOUS; SUBCUTANEOUS at 05:28

## 2020-01-23 RX ADMIN — Medication 600 MILLIGRAM(S): at 12:50

## 2020-01-23 RX ADMIN — Medication 975 MILLIGRAM(S): at 10:04

## 2020-01-23 RX ADMIN — Medication 975 MILLIGRAM(S): at 09:04

## 2020-01-23 NOTE — PROGRESS NOTE ADULT - SUBJECTIVE AND OBJECTIVE BOX
INTERVAL HPI/OVERNIGHT EVENTS: Pt seen and examined at bedside.  Doing well, denies complaints. +voiding without difficulty, +ambulation, arelis reg diet. denies heavy lochia     MEDICATIONS  (STANDING):  acetaminophen   Tablet .. 975 milliGRAM(s) Oral <User Schedule>  diphtheria/tetanus/pertussis (acellular) Vaccine (ADAcel) 0.5 milliLiter(s) IntraMuscular once  ibuprofen  Tablet. 600 milliGRAM(s) Oral every 6 hours  oxytocin Infusion 333.333 milliUNIT(s)/Min (1000 mL/Hr) IV Continuous <Continuous>  oxytocin Infusion 2 milliUNIT(s)/Min (2 mL/Hr) IV Continuous <Continuous>  prenatal multivitamin 1 Tablet(s) Oral daily  sodium chloride 0.9% lock flush 3 milliLiter(s) IV Push every 8 hours    MEDICATIONS  (PRN):  benzocaine 20%/menthol 0.5% Spray 1 Spray(s) Topical every 6 hours PRN for Perineal discomfort  dibucaine 1% Ointment 1 Application(s) Topical every 6 hours PRN Perineal discomfort  diphenhydrAMINE 25 milliGRAM(s) Oral every 6 hours PRN Pruritus  glycerin Suppository - Adult 1 Suppository(s) Rectal at bedtime PRN Constipation  hydrocortisone 1% Cream 1 Application(s) Topical every 6 hours PRN Moderate Pain (4-6)  lanolin Ointment 1 Application(s) Topical every 6 hours PRN nipple soreness  magnesium hydroxide Suspension 30 milliLiter(s) Oral two times a day PRN Constipation  oxyCODONE    IR 5 milliGRAM(s) Oral every 3 hours PRN Moderate to Severe Pain (4-10)  oxyCODONE    IR 5 milliGRAM(s) Oral once PRN Moderate to Severe Pain (4-10)  pramoxine 1%/zinc 5% Cream 1 Application(s) Topical every 4 hours PRN Moderate Pain (4-6)  simethicone 80 milliGRAM(s) Chew every 4 hours PRN Gas  witch hazel Pads 1 Application(s) Topical every 4 hours PRN Perineal discomfort      Vital Signs Last 24 Hrs  T(C): 37 (23 Jan 2020 10:31), Max: 37 (23 Jan 2020 10:31)  T(F): 98.6 (23 Jan 2020 10:31), Max: 98.6 (23 Jan 2020 10:31)  HR: 86 (23 Jan 2020 10:31) (70 - 150)  BP: 120/72 (23 Jan 2020 10:31) (113/57 - 146/69)  BP(mean): --  RR: 18 (23 Jan 2020 10:31) (16 - 18)  SpO2: 100% (23 Jan 2020 10:31) (93% - 100%)    PHYSICAL EXAM:    GA: NAD, A+0 x 3  Abd:  soft, nontender, nondistended, no rebound or guarding,   Uterus: Fundus midline; firm  VE: nml lochia    LABS:                        12.5   9.86  )-----------( 234      ( 22 Jan 2020 10:35 )             37.3

## 2020-01-23 NOTE — PROGRESS NOTE ADULT - SUBJECTIVE AND OBJECTIVE BOX
post epidural d/c follow-up:    pt states freely voiding and ambulating.  Denies headache, nausea, vomiting.  VSS.  States no concerns at this time.  ICU Vital Signs Last 24 Hrs  T(C): 36.6 (23 Jan 2020 06:28), Max: 36.9 (22 Jan 2020 10:15)  T(F): 97.8 (23 Jan 2020 06:28), Max: 98.42 (22 Jan 2020 10:18)  HR: 89 (23 Jan 2020 01:30) (64 - 150)  BP: 113/57 (23 Jan 2020 01:30) (113/57 - 146/69)  BP(mean): --  ABP: --  ABP(mean): --  RR: 16 (23 Jan 2020 06:28) (16 - 20)  SpO2: 99% (23 Jan 2020 06:28) (92% - 100%)

## 2020-01-23 NOTE — LACTATION INITIAL EVALUATION - LACTATION INTERVENTIONS
initiate skin to skin/Instructed with positioning to facilitate proper latch.  Encouraged to feed with early hunger cues, 8-12 times in 24 hours.  Infant is less than 24 hours old at this time.  Safe skin to skin with frequent attempts encouraged. Taught hand expression. Discussed outpatient resources available, warm line, breastfeeding support group. Encouraged patient to call for assistance, as needed.  Primary RN made aware of consult and plan./initiate hand expression routine

## 2020-01-24 VITALS
DIASTOLIC BLOOD PRESSURE: 65 MMHG | TEMPERATURE: 98 F | HEART RATE: 67 BPM | SYSTOLIC BLOOD PRESSURE: 120 MMHG | RESPIRATION RATE: 18 BRPM | OXYGEN SATURATION: 100 %

## 2020-01-24 RX ADMIN — Medication 600 MILLIGRAM(S): at 00:55

## 2020-01-24 RX ADMIN — Medication 975 MILLIGRAM(S): at 09:15

## 2020-01-24 RX ADMIN — Medication 1 TABLET(S): at 09:16

## 2020-01-24 RX ADMIN — Medication 975 MILLIGRAM(S): at 09:45

## 2020-01-24 RX ADMIN — Medication 600 MILLIGRAM(S): at 00:04

## 2020-01-24 NOTE — PROGRESS NOTE ADULT - ASSESSMENT
Physical Exam:     Gen: A&Ox 3, NAD  Chest: CTA B/L  Cardiac: S1,S2; RRR  Breast: Soft, nontender, nonengorged  Abdomen: +BS; Soft, nontender, ND; Fundus firm below umbilicus  Gyn: Min lochia  Ext: Nontender, DTRS 2+, no worsening edema

## 2020-01-24 NOTE — PROGRESS NOTE ADULT - SUBJECTIVE AND OBJECTIVE BOX
NP provider Note:  Patient seen at bedside resting comfortably offers no current complaints.  Ambulating and voiding without difficulty.  Passing flatus and tolerating regular diet.  Both breast/bottle feeding. Bonding well with   Denies HA, CP, SOB, N/V/D, dizziness, palpitations, worsening abdominal pain, worsening vaginal bleeding, or any other concerns.      Vital Signs Last 24 Hrs  T(C): 36.8 (2020 06:00), Max: 37 (2020 10:31)  T(F): 98.2 (2020 06:00), Max: 98.6 (2020 10:31)  HR: 67 (2020 06:00) (67 - 86)  BP: 120/65 (2020 06:00) (106/62 - 120/72)  BP(mean): --  RR: 18 (2020 06:00) (17 - 18)  SpO2: 100% (2020 06:00) (99% - 100%)                              12.5   9.86  )-----------( 234      ( 2020 10:35 )             37.3        A/P:  PPD#2 s/p       - Discharge home with instructions  -Follow up in office in 5-6 weeks for postpartum visit  -Breastfeeding encouraged   -d/w dr Woodall

## 2020-04-04 NOTE — DISCHARGE NOTE OB - CLICK TO LAUNCH ORM
Time spent in chart 11-20 minutes.  Attempted video visit connect x2 with no answer.    S: URI complaints of earache/drg, sinus pain and pressure with green drg, sore throat, swollen glands and neck pain for last week.  Temp 99.9.  Tried multiple OTC meds such as decongestants, nasal spray, allergy meds and saline rinse with only temporary relief.    A/P  Acute Bacterial Sinusitis, Otalgia    Treatment with Doxycycline antibiotic and continue mucinex and flonase nasal spray with history of asthma.      Rest/fluids, humidity.  Avoid sun exposure with Doxy.    Followup with PCP or Urgent Care if worsen.   .

## 2020-05-22 NOTE — OB RN PATIENT PROFILE - COMFORT/ACCEPTABLE PAIN LEVEL (0-10)
Notes recorded by Nena Hill MD on 5/20/2020 at 12:11 PM CDT  John patient - Glycohemoglobin elevated more than last year - consider referral to dietician and consider chromium picolinate supplement    Pt notified of results and recommendations  Patient verbalized understanding  All questions answered  Patient encouraged to call office or MD with any questions or concerns      Pt would like referral to dietician and order placed.   9

## 2020-09-30 NOTE — ED PROVIDER NOTE - PROGRESS NOTE
[Excellent] : ~his/her~  mood as  excellent [Yes] : Yes [Monthly or less (1 pt)] : Monthly or less (1 point) [1 or 2 (0 pts)] : 1 or 2 (0 points) [Never (0 pts)] : Never (0 points) [No] : In the past 12 months have you used drugs other than those required for medical reasons? No [No falls in past year] : Patient reported no falls in the past year [0] : 2) Feeling down, depressed, or hopeless: Not at all (0) [None] : None [With Family] : lives with family [Employed] : employed [Single] : single [Feels Safe at Home] : Feels safe at home [Fully functional (bathing, dressing, toileting, transferring, walking, feeding)] : Fully functional (bathing, dressing, toileting, transferring, walking, feeding) [Fully functional (using the telephone, shopping, preparing meals, housekeeping, doing laundry, using] : Fully functional and needs no help or supervision to perform IADLs (using the telephone, shopping, preparing meals, housekeeping, doing laundry, using transportation, managing medications and managing finances) [Reports normal functional visual acuity (ie: able to read med bottle)] : Reports normal functional visual acuity [Smoke Detector] : smoke detector [Seat Belt] :  uses seat belt [] : No [Audit-CScore] : 1 [[id number]] : 0 [Change in mental status noted] : No change in mental status noted [Language] : denies difficulty with language [Behavior] : denies difficulty with behavior [Learning/Retaining New Information] : denies difficulty learning/retaining new information [Handling Complex Tasks] : denies difficulty handling complex tasks [Reasoning] : denies difficulty with reasoning [Spatial Ability and Orientation] : denies difficulty with spatial ability and orientation [Reports changes in hearing] : Reports no changes in hearing [Reports changes in vision] : Reports no changes in vision [Reports changes in dental health] : Reports no changes in dental health Improved.

## 2021-01-01 NOTE — OB PROVIDER H&P - PMH
DISCHARGE SUMMARY:  Discharge Time: 1130  Via:  carseat  Accompanied by:  Relative  Verbal Instructions given: Yes to mother and grandmother  Verbalized understanding: Yes by mother and grandmother  Written Instructions given: Yes to mother and grandmother  Patient Able to Void: Yes  Discharged Stable Per MD Order: Yes     Abnormal cervical Papanicolaou smear, unspecified abnormal pap finding  right before preg.  Termination of pregnancy (fetus)    UTI (urinary tract infection)    Vaginal delivery   2008 6#9

## 2021-09-16 NOTE — OB PROVIDER TRIAGE NOTE - [id number]_NU
PAST MEDICAL HISTORY:  Angina pectoris     CAD (coronary artery disease)     CHF (congestive heart failure)     DM (diabetes mellitus)     Dyslipidemia     HTN (hypertension)     PE (pulmonary thromboembolism)       CHF (congestive heart failure)     HLD (hyperlipidemia)     
38

## 2022-07-07 NOTE — OB PROVIDER TRIAGE NOTE - NSFIRSTDATEVISIT_OBGYN_ALL_OB
[place] ENCOUNTER          Pt Name: Guillermo Atwood  MRN: [id number]  [place] 1938  Date of Evaluation: 7/7/2022  Treating Resident Physician: Nando Duran MD  Supervising Physician: Tj Douglas MD    CHIEF COMPLAINT       Chief Complaint   Patient presents with    Shortness of Breath    Fever     History obtained from chart review and the patient. HISTORY OF PRESENT ILLNESS    HPI    Guillermo Atwood is a 80 y.o. male with a past medical history significant for  Diverticular disease, chronic kidney disease, hyperlipidemia, hypertension, hiatal hernia urethral stricture, recent urinary catheter placement, recent cystoscopy, presents to the emergency department for evaluation of shortness of breath, fever, chills, rigors. [facility] recently had a cystoscopy and was found to have a urethral stricture. He subsequently had a urinary catheter placed. He had been on prophylactic antibiotics of Keflex. He finished Keflex a few days ago. And had the urinary catheter removed yesterday. This morning had shortness of breath, fatigue, shakes which sound like rigors. Patient had a fever at home of [address]. This improved with acetaminophen. The patient has no other acute complaints at this time. REVIEW OF SYSTEMS   Review of Systems   Constitutional: Positive for activity change, appetite change, chills, fatigue and fever. Negative for diaphoresis and unexpected weight change. HENT: Negative for congestion, nosebleeds and sore throat. Respiratory: Positive for shortness of breath. Negative for cough, choking, chest tightness, wheezing and stridor. Cardiovascular: Negative for chest pain, palpitations and leg swelling. Gastrointestinal: Positive for nausea. Negative for abdominal pain, constipation, diarrhea and vomiting. Genitourinary: Negative for dysuria, penile discharge, penile pain and urgency.    Musculoskeletal: Negative for back pain and neck pain. Skin: Negative for color change, pallor, rash and wound. Allergic/Immunologic: Negative for environmental allergies, food allergies and immunocompromised state. Neurological: Negative for dizziness, seizures, syncope, weakness and headaches. Hematological: Negative for adenopathy. Does not bruise/bleed easily. Psychiatric/Behavioral: Negative for agitation and confusion. All other systems reviewed and are negative.         PAST MEDICAL AND SURGICAL HISTORY     Past Medical History:   Diagnosis Date    Anemia     Arthritis     Bronchiolitis 11/2016    Cancer [facility])     SKIN CANCER    CKD (chronic kidney disease), stage III ([facility] [address]     Diverticula of colon 5/2014    tx with antibiotics per pt; NO surgery    Diverticulosis     DVT [facility] thrombosis) ([facility])     s/p    Esophagitis 12/10/2014    Hiatal hernia     HTN (hypertension)     Hyperlipidemia     Kidney stones     Nephrolithiasis     Obesity     Prostate enlargement     benign    Renal insufficiency      Past Surgical History:   Procedure Laterality Date    BLEPHAROPLASTY Bilateral 03/2010    CARDIAC CATHETERIZATION  2000,2010    CATARACT REMOVAL WITH IMPLANT Bilateral 2006,2007    COLONOSCOPY  05/26/2017    ENDOSCOPY, COLON, DIAGNOSTIC      HERNIA REPAIR Bilateral 1996    NASAL SINUS SURGERY      SHOULDER SURGERY      SKIN BIOPSY      SKIN CANCER EXCISION  Feb 2015    top of head    TONSILLECTOMY      TURP  8036,1272    TURP N/A 4/2/2022    CYSTOSCOPY GREENLIGHT PHOTOVAPORIZATION OF THE PROSTATE WITH URETHERAL DILATION performed by Ursula Cranker, MD at [address] [place] [address] Facility-Administered Medications:     sodium chloride flush 0.9 % injection 5-40 mL, 5-40 mL, IntraVENous, 2 times per day, Jayjay Churchill MD    sodium chloride flush 0.9 % injection 5-40 mL, 5-40 mL, IntraVENous, PRN, Jayjay Churchill MD    0.9 % sodium chloride infusion, , IntraVENous, PRN, Andris Essex, MD    enoxaparin (LOVENOX) injection 40 mg, 40 mg, SubCUTAneous, Q24H, Andris Essex, MD    ondansetron (ZOFRAN-ODT) disintegrating tablet 4 mg, 4 mg, Oral, Q8H PRN **OR** ondansetron (ZOFRAN) injection 4 mg, 4 mg, IntraVENous, Q6H PRN, Andris Essex, MD    polyethylene glycol (GLYCOLAX) packet 17 g, 17 g, Oral, Daily PRN, Andris Essex, MD    acetaminophen (TYLENOL) tablet 650 mg, 650 mg, Oral, Q6H PRN **OR** acetaminophen (TYLENOL) suppository 650 mg, 650 mg, Rectal, Q6H PRN, Andris Essex, MD    0.9 % sodium chloride infusion, , IntraVENous, Continuous, Andris Essex, MD Costella Lincoln  [START ON 7/8/2022] cefTRIAXone (ROCEPHIN) 2000 mg IVPB in D5W 50ml minibag, 2,000 mg, IntraVENous, Q24H, Andris Essex, MD Costella Lincoln Bessie Melia ON 7/8/2022] amLODIPine (NORVASC) tablet 10 mg, 10 mg, Oral, Daily, Andris Essex, MD    citalopram (CELEXA) tablet 20 mg, 20 mg, Oral, Daily, Andris Essex, MD    dicyclomine (BENTYL) capsule 10 mg, 10 mg, Oral, 4x Daily PRN, Andris Essex, MD    hydrALAZINE (APRESOLINE) tablet 10 mg, 10 mg, Oral, BID, Andris Essex, MD Costella Lincoln Bessie Melia ON 7/8/2022] losartan (COZAAR) tablet 100 mg, 100 mg, Oral, Daily, Andris Essex, MD Costella Lincoln Bessie Melia ON 7/8/2022] pantoprazole (PROTONIX) tablet 40 mg, 40 mg, Oral, Daily, Andris Essex, MD    pravastatin (PRAVACHOL) tablet 40 mg, 40 mg, Oral, Nightly, Andris Essex, MD    primidone (MYSOLINE) tablet 50 mg, 50 mg, Oral, BID, Andris Essex, MD    [facility]) chewable tablet 80 mg, 80 mg, Oral, Q6H PRN, Andris Essex, MD    traMADol Dale Abdirahman) tablet 50 mg, 50 mg, Oral, Q8H PRN, Andris Essex, MD    triamterene-hydroCHLOROthiazide (MAXZIDE-25) 37.5-25 MG per tablet 1 tablet, 1 tablet, Oral, Daily, Andris Essex, MD      SOCIAL HISTORY     Social History     Social History Narrative    Not on file     Social History     Tobacco Use    Smoking status: Never Smoker    Smokeless tobacco: Never Used    Tobacco comment: never smoked   Vaping Use    Vaping Use: Never used Substance Use Topics    Alcohol use: Never    Drug use: No         ALLERGIES     Allergies   Allergen Reactions    Ace Inhibitors      Intolerant to      Carafate [Sucralfate] Other (See Comments)     Tongue swelling    Flomax [Tamsulosin Hcl]     Iv Dye [Iodides]     Lipitor [Atorvastatin] Other (See Comments)     Myalgias    Lopressor [Metoprolol]     Motrin [Ibuprofen]      Was told not to take due to kidneys         FAMILY HISTORY     Family History   Problem Relation Age of Onset    Diabetes Mother     High Blood Pressure Father     Heart Disease Father          PREVIOUS RECORDS   Previous records reviewed: Prior ED visits, urology notes reviewed. PHYSICAL EXAM     ED Triage Vitals   BP Temp Temp Source Heart Rate Resp SpO2 Height Weight   07/07/22 1411 07/07/22 1411 07/07/22 1411 07/07/22 1409 07/07/22 1409 07/07/22 1409 -- 07/07/22 1409   (!) 169/74 99.3 °F (37.4 °C) Oral 85 20 99 %  210 lb (95.3 kg)     Initial vital signs and nursing assessment reviewed and abnormal from Elevated blood pressure. Body mass index is 30.72 kg/m². Pulsoximetry is normal per my interpretation. Additional Vital Signs:  Vitals:    07/07/22 2302   BP: (!) 169/56   Pulse: 78   Resp: 18   Temp: 100.4 °F (38 °C)   SpO2: 95%       Physical Exam  Vitals and nursing note reviewed. Constitutional:       General: He is not in acute distress. Appearance: He is well-developed and normal weight. He is ill-appearing. He is not toxic-appearing. HENT:      Head: Normocephalic and atraumatic. Mouth/Throat:      Mouth: Mucous membranes are moist.      Pharynx: Oropharynx is clear. Eyes:      Extraocular Movements: Extraocular movements intact. Pupils: Pupils are equal, round, and reactive to light. Cardiovascular:      Rate and Rhythm: Normal rate and regular rhythm. Pulses: Normal pulses. Heart sounds: Normal heart sounds.    Pulmonary:      Effort: Pulmonary effort is normal. No tachypnea or bradypnea. Breath sounds: Normal breath sounds. No decreased breath sounds, wheezing or rales. Abdominal:      General: Bowel sounds are normal.      Palpations: Abdomen is soft. Tenderness: There is no abdominal tenderness. Musculoskeletal:      Cervical back: Normal range of motion. Right lower leg: No tenderness. No edema. Left lower leg: No tenderness. No edema. Skin:     General: Skin is warm and dry. Capillary Refill: Capillary refill takes less than 2 seconds. Neurological:      Mental Status: He is alert. Psychiatric:         Mood and Affect: Mood normal.         Behavior: Behavior normal.             MEDICAL DECISION MAKING   Initial Differential Diagnosis: (includes but is not limited to)  1. Sepsis  2. Urinary tract infection  3. Pneumonia  4. Influenza  5. COVID-19  6. Acute coronary syndrome    Plan:    ECG then monitor   IV access   CBC, BMP, LFT, troponin, lactic acid, blood cultures,   Swabs for COVID-19 influenza   Urinalysis   Portable chest x-ray   IV fluid bolus    Summary:  White blood cell count elevated 15.3, urinalysis shows positive leukocytes, moderate bacteria seen on microscopy. Given patient's recent urological procedure and prophylactic antibiotic use, patient has a complicated urinary tract infection. Patient started on ceftriaxone after collection of blood cultures. Hospitalist service consulted for admission.     ED RESULTS   Laboratory results:  Labs Reviewed   BASIC METABOLIC PANEL W/ REFLEX TO MG FOR LOW K - Abnormal; Notable for the following components:       Result Value    Sodium 132 (*)     CO2 22 (*)     Glucose 134 (*)     BUN 28 (*)     CREATININE 1.6 (*)     All other components within normal limits   CBC WITH AUTO DIFFERENTIAL - Abnormal; Notable for the following components:    WBC 15.3 (*)     RBC 4.44 (*)     Hemoglobin 13.2 (*)     Hematocrit 40.3 (*)     RDW-SD 46.1 (*)     Segs Absolute 13.7 (*)     Lymphocytes Absolute 0.6 (*)     Immature Grans (Abs) 0.09 (*)     All other components within normal limits   URINE WITH REFLEXED MICRO - Abnormal; Notable for the following components:    Blood, Urine LARGE (*)     Protein,  (*)     Leukocyte Esterase, Urine LARGE (*)     Character, Urine TURBID (*)     All other components within normal limits   GLOMERULAR FILTRATION RATE, ESTIMATED - Abnormal; Notable for the following components:    Est, Glom Filt Rate 41 (*)     All other components within normal limits   OSMOLALITY - Abnormal; Notable for the following components:    Osmolality Calc 272.0 (*)     All other components within normal limits   COVID-19, RAPID   RAPID INFLUENZA A/B ANTIGENS   CULTURE, BLOOD 1   CULTURE, BLOOD 2   CULTURE, REFLEXED, URINE    Narrative:     Source: urine, clean catch       Site: clean void          Current Antibiotics: none   BRAIN NATRIURETIC PEPTIDE   TROPONIN   HEPATIC FUNCTION PANEL   LACTATE, SEPSIS   LACTATE, SEPSIS   ANION GAP   BASIC METABOLIC PANEL W/ REFLEX TO MG FOR LOW K   CBC WITH AUTO DIFFERENTIAL   POCT GLUCOSE       Radiologic studies results:  VL DUP LOWER EXTREMITY VENOUS RIGHT   Final Result   Impression:   1. Negative for deep venous thrombosis in the right lower extremity. 2. Acute occlusive superficial thrombophlebitis in the calf greater    saphenous vein. This document has been electronically signed by: Janet Costa MD on    07/07/2022 09:07 PM      Technique Used: Duplex examination performed utilizing grayscale, color    and spectral analysis. XR CHEST (2 VW)   Final Result   1. No interval change since previous study dated 21 March 2022, no acute cardiopulmonary disease. **This report has been created using voice recognition software. It may contain minor errors which are inherent in voice recognition technology. **      Final report electronically signed by DR Doyle Alegria on 7/7/2022 3:48 PM          ED Medications administered this visit: Medications   sodium chloride flush 0.9 % injection 5-40 mL (has no administration in time range)   sodium chloride flush 0.9 % injection 5-40 mL (has no administration in time range)   0.9 % sodium chloride infusion (has no administration in time range)   enoxaparin (LOVENOX) injection 40 mg (has no administration in time range)   ondansetron (ZOFRAN-ODT) disintegrating tablet 4 mg (has no administration in time range)     Or   ondansetron (ZOFRAN) injection 4 mg (has no administration in time range)   polyethylene glycol (GLYCOLAX) packet 17 g (has no administration in time range)   acetaminophen (TYLENOL) tablet 650 mg (has no administration in time range)     Or   acetaminophen (TYLENOL) suppository 650 mg (has no administration in time range)   0.9 % sodium chloride infusion (has no administration in time range)   cefTRIAXone (ROCEPHIN) 2000 mg IVPB in D5W 50ml minibag (has no administration in time range)   amLODIPine (NORVASC) tablet 10 mg (has no administration in time range)   citalopram (CELEXA) tablet 20 mg (has no administration in time range)   dicyclomine (BENTYL) capsule 10 mg (has no administration in time range)   hydrALAZINE (APRESOLINE) tablet 10 mg (has no administration in time range)   losartan (COZAAR) tablet 100 mg (has no administration in time range)   pantoprazole (PROTONIX) tablet 40 mg (has no administration in time range)   pravastatin (PRAVACHOL) tablet 40 mg (has no administration in time range)   primidone (MYSOLINE) tablet 50 mg (has no administration in time range)   simethicone (MYLICON) chewable tablet 80 mg (has no administration in time range)   traMADol (ULTRAM) tablet 50 mg (has no administration in time range)   triamterene-hydroCHLOROthiazide (MAXZIDE-25) 37.5-25 MG per tablet 1 tablet (has no administration in time range)   lactated ringers bolus (0 mLs IntraVENous Stopped 7/7/22 4840)   cefTRIAXone (ROCEPHIN) 2000 mg IVPB in D5W 50ml minibag (0 mg IntraVENous Stopped Unknown

## 2023-03-22 NOTE — OB RN PATIENT PROFILE - PURPOSEFUL PROACTIVE ROUNDING
UNABLE TO WT CLINICAL     Caller: [facility]    Relationship to patient: DAUGHTER ON  [place]     Best call back number: [phone]    Patient is needing: PATIENT IN A LOT OF PAIN, CURRENTLY SCHEDULED 04/10/23 WITH DR MOSCOSO.  CAN PATIENT BE WORKED IN SOONER.  PLEASE CALL TO DISCUSS WHAT SHE CAN  DO FOR RELIEF PLEASE          Patient

## 2025-01-22 NOTE — OB PROVIDER TRIAGE NOTE - TERM DELIVERIES, OB PROFILE
Patient is scheduled for a colonoscopy with Dr Jones on 3/18/25. Please send Nulytely prep to patient's selected pharmacy selected during scheduling.       Thank you, GI Preadmit Surgery Scheduler 
1
